# Patient Record
Sex: FEMALE | Race: WHITE | NOT HISPANIC OR LATINO | Employment: OTHER | URBAN - METROPOLITAN AREA
[De-identification: names, ages, dates, MRNs, and addresses within clinical notes are randomized per-mention and may not be internally consistent; named-entity substitution may affect disease eponyms.]

---

## 2018-09-04 NOTE — PATIENT DISCUSSION
MYOPIA, OU- DISC OPT OF REFRACTIVE SX-VS-GLS/[id number]. PT UNDERSTANDS OPTIONS AND DESIRES TO PROCEED WITH REFRACTIVE SX TO IMPROVE VA AND REDUCE DEPENDENCY ON GLS/CTLS.

## 2018-09-04 NOTE — PATIENT DISCUSSION
HIGH ASTIGMATISM, OS- DISCUSSED INCREASED RISK OF GLARE AND HALOS AROUND LIGHTS WITH A 1% CHANCE OF LIMITING EVERYDAY ACTIVITIES, ALTHOUGH UNLIKELY. PT REASSURED SHOULD THIS OCCUR WE WOULD EXHAUST ALL MEASURES TO ADDRESS.

## 2018-09-04 NOTE — PATIENT DISCUSSION
ADDRESSED THAT SOME OF HIS ASTIGMATISM IS LENTICULAR WHICH WILL BE CORRECTED BY CREATING ASTIGMATISM ON THE SURFACE OF THE EYE.  PT UNDERSTANDS AND DESIRES TO PROCEED

## 2018-09-20 NOTE — PATIENT DISCUSSION
Continue: Pred Forte (prednisolone acetate): drops,suspension: 1% 1 drop as directed as directed into both eyes

## 2018-09-20 NOTE — PATIENT DISCUSSION
MYOPIA, OU- DISC OPT OF REFRACTIVE SX-VS-GLS/[id number]. PT UNDERSTANDS OPTIONS AND DESIRES TO PROCEED WITH REFRACTIVE SX TO IMPROVE VA AND REDUCE DEPENDENCY ON GLS/CTLS.

## 2022-06-06 ENCOUNTER — ESTABLISHED PATIENT (OUTPATIENT)
Dept: URBAN - METROPOLITAN AREA CLINIC 7 | Facility: CLINIC | Age: 72
End: 2022-06-06

## 2022-06-06 DIAGNOSIS — H52.03: ICD-10-CM

## 2022-06-06 PROCEDURE — 92015 DETERMINE REFRACTIVE STATE: CPT

## 2022-06-06 PROCEDURE — 92014 COMPRE OPH EXAM EST PT 1/>: CPT

## 2022-06-06 ASSESSMENT — KERATOMETRY
OD_AXISANGLE2_DEGREES: 172
OD_K1POWER_DIOPTERS: 42.50
OD_AXISANGLE_DEGREES: 82
OS_AXISANGLE2_DEGREES: 10
OD_K2POWER_DIOPTERS: 43.5
OS_K2POWER_DIOPTERS: 43.75
OS_AXISANGLE_DEGREES: 100
OS_K1POWER_DIOPTERS: 42.75

## 2022-06-06 ASSESSMENT — VISUAL ACUITY
OS_SC: 20/50
OD_SC: 20/70
OU_SC: 20/50
OU_CC: 20/30

## 2022-06-06 ASSESSMENT — TONOMETRY
OD_IOP_MMHG: 14
OS_IOP_MMHG: 15

## 2023-08-03 ENCOUNTER — ESTABLISHED PATIENT (OUTPATIENT)
Dept: URBAN - METROPOLITAN AREA CLINIC 10 | Facility: CLINIC | Age: 73
End: 2023-08-03

## 2023-08-03 DIAGNOSIS — H43.813: ICD-10-CM

## 2023-08-03 DIAGNOSIS — H25.13: ICD-10-CM

## 2023-08-03 DIAGNOSIS — H52.03: ICD-10-CM

## 2023-08-03 PROCEDURE — 92015 DETERMINE REFRACTIVE STATE: CPT

## 2023-08-03 PROCEDURE — 92014 COMPRE OPH EXAM EST PT 1/>: CPT

## 2023-08-03 PROCEDURE — 92250 FUNDUS PHOTOGRAPHY W/I&R: CPT

## 2023-08-03 ASSESSMENT — KERATOMETRY
OS_K2POWER_DIOPTERS: 44.00
OS_AXISANGLE2_DEGREES: 15
OD_AXISANGLE2_DEGREES: 150
OD_K1POWER_DIOPTERS: 42.50
OS_K1POWER_DIOPTERS: 43.00
OS_AXISANGLE_DEGREES: 105
OD_AXISANGLE_DEGREES: 60
OD_K2POWER_DIOPTERS: 43.25

## 2023-08-03 ASSESSMENT — VISUAL ACUITY
OU_CC: 20/30
OD_CC: 20/40-1
OS_CC: 20/30

## 2023-08-03 ASSESSMENT — TONOMETRY
OS_IOP_MMHG: 16
OD_IOP_MMHG: 16

## 2024-08-08 ENCOUNTER — COMPREHENSIVE EXAM (OUTPATIENT)
Facility: LOCATION | Age: 74
End: 2024-08-08

## 2024-08-08 DIAGNOSIS — H25.13: ICD-10-CM

## 2024-08-08 DIAGNOSIS — H52.03: ICD-10-CM

## 2024-08-08 DIAGNOSIS — H43.813: ICD-10-CM

## 2024-08-08 PROCEDURE — 92015 DETERMINE REFRACTIVE STATE: CPT

## 2024-08-08 PROCEDURE — 92014 COMPRE OPH EXAM EST PT 1/>: CPT

## 2024-08-08 PROCEDURE — 92250 FUNDUS PHOTOGRAPHY W/I&R: CPT

## 2024-08-08 ASSESSMENT — KERATOMETRY
OS_K2POWER_DIOPTERS: 44.00
OD_AXISANGLE_DEGREES: 60
OD_K2POWER_DIOPTERS: 43.25
OS_AXISANGLE2_DEGREES: 15
OD_K1POWER_DIOPTERS: 42.50
OS_AXISANGLE_DEGREES: 105
OS_K1POWER_DIOPTERS: 43.00
OD_AXISANGLE2_DEGREES: 150

## 2024-08-08 ASSESSMENT — TONOMETRY
OS_IOP_MMHG: 15
OD_IOP_MMHG: 15

## 2024-08-08 ASSESSMENT — VISUAL ACUITY
OD_CC: 20/40
OS_CC: 20/40-1

## 2025-06-27 ENCOUNTER — COMPREHENSIVE EXAM (OUTPATIENT)
Age: 75
End: 2025-06-27

## 2025-06-27 DIAGNOSIS — H43.813: ICD-10-CM

## 2025-06-27 DIAGNOSIS — H25.13: ICD-10-CM

## 2025-06-27 DIAGNOSIS — H52.03: ICD-10-CM

## 2025-06-27 PROCEDURE — 92014 COMPRE OPH EXAM EST PT 1/>: CPT

## 2025-06-27 PROCEDURE — 92015 DETERMINE REFRACTIVE STATE: CPT
